# Patient Record
Sex: FEMALE | Race: WHITE | Employment: OTHER | URBAN - METROPOLITAN AREA
[De-identification: names, ages, dates, MRNs, and addresses within clinical notes are randomized per-mention and may not be internally consistent; named-entity substitution may affect disease eponyms.]

---

## 2019-05-08 ENCOUNTER — HOSPITAL ENCOUNTER (OUTPATIENT)
Dept: PHYSICAL THERAPY | Age: 84
Discharge: HOME | End: 2019-05-08
Payer: MEDICARE

## 2019-05-08 PROCEDURE — 97530 THERAPEUTIC ACTIVITIES: CPT

## 2019-05-08 PROCEDURE — 97162 PT EVAL MOD COMPLEX 30 MIN: CPT

## 2019-05-08 NOTE — THERAPY EVALUATION
Zulma Headings  : 1930 [id number] [facility],[address]  [address] [place] [address].  [phone]   [phone]        OUTPATIENT PHYSICAL THERAPY:Initial Assessment 2019         ICD-10: Treatment Diagnosis: Muscle weakness (generalized) (M62.81)Repeated falls (R29.6)  Precautions/Allergies:   Patient has no allergy information on record. Fall Risk Score:     Ambulatory/Rehab Services H2 Model Falls Risk Assessment    Risk Factors:       No Risk Factors Identified Ability to Rise from Chair:       (3)  Multiple attempts, but successful    Falls Prevention Plan:       No modifications necessary   Total: (5 or greater = High Risk): 3     [facility] [employer] All Rights Reserved. [facility] [employer] Patent #[id number]. Federal Law prohibits the replication, distribution or use without written permission from [facility] [employer]     MD Orders: eval and treat MEDICAL/REFERRING DIAGNOSIS:  Other malaise [R53.81]   DATE OF ONSET: 2019  REFERRING PHYSICIAN: Demetrius Carranza NP  RETURN PHYSICIAN APPOINTMENT: 2019     INITIAL ASSESSMENT:  Ms. Ac Robledo presents with marked weakness and balance issues making her a high fall risk. She is adamant she is fine and does not need even a cane for ambulation and is not a fall risk because she does not plan to fall again. She will benefit from skilled PT to address her weakness and improve her balance strategies for safe ADL performance and gait on all surfaces. PROBLEM LIST (Impacting functional limitations):  1. Decreased Strength  2. Decreased ADL/Functional Activities  3. Decreased Transfer Abilities  4. Decreased Ambulation Ability/Technique  5. Decreased Balance  6. Decreased Activity Tolerance  7. Decreased Flexibility/Joint Mobility  8. Decreased Brogue with Home Exercise Program INTERVENTIONS PLANNED:  1. Home Exercise Program (HEP)  2. Therapeutic Activites  3.  Therapeutic Exercise/Strengthening TREATMENT PLAN:  Effective Dates: 5/8/2019 TO 8/6/2019 (90 days). Frequency/Duration: 2 times a week for 90 Days  GOALS: (Goals have been discussed and agreed upon with patient.)  Short-Term Functional Goals: Time Frame: 2 weeks  1. Pt to report compliance with HEP. Discharge Goals: Time Frame: 12 wweks  1. Pt to demonstrate quad strength to allow sit to stand without UE support or bracing legs against a chair. 2. Pt to increase SLS > 5 sec for safe amb level surfaces. 3. Pt to increase hip strength to resume walking the gym track for exercise. Rehabilitation Potential For Stated Goals: Fair  Regarding Sherlyn Mccauley's therapy, I certify that the treatment plan above will be carried out by a therapist or under their direction. Thank you for this referral,  Juliana Gilbert, PT       Referring Physician Signature: Derian Ding., NP              Date                      HISTORY:   History of Present Injury/Illness (Reason for Referral):  Pt has no idea why she is coming to therapy and has no idea what is going on with her. Daughter found her on the floor in Jan.  She had fallen 3 times but not told anyone. She is now living with her daughter and son-in-law. She was hospitalized with pneumonia. She had home health and they said she needed more therapy. Balance is an issue so she uses her cane. She refuses to use a walker stating she doesn't even need a cane. She states she doesn't plan to fall again and wants to resume living in her home independently and resume driving. She states HH told her she passed all their tests and she is better than anyone else her age. Past Medical History/Comorbidities:   Ms. Troy Vogt  has no past medical history on file. Ms. Troy Vogt  has no past surgical history on file.   Social History/Living Environment:     pt lives with family in single story home  Prior Level of Function/Work/Activity:  retired  Dominant Side:         RIGHT  Current Medications:  No current outpatient medications on file. Date Last Reviewed:  5/8/2019   # of Personal Factors/Comorbidities that affect the Plan of Care: 1-2: MODERATE COMPLEXITY   EXAMINATION:   Observation/Orthostatic Postural Assessment:          Pt uses a straight cane for amb. Trunk is very rigid and she has jerky balance reactions. Palpation:          unremarkable  ROM:     0 degrees DF B   Other motions WFL    Strength:     Right  Hip flex 3-/5  Hip ext 3/5  Hip abd 3-/5  Knee ext 4/5  PF 2/5 Left  3-/5  2/5  2/5  4/5  2/5     Neurological Screen:        unremarkable  Functional Mobility:         Gait/Ambulation:  Unsteady, rigid with straight cane        Transfers:  Uses hands to push up        Stairs:  Uses hands to pull on rails, balance checks backwards  Balance:          < 2 sec B   Body Structures Involved:  1. Muscles Body Functions Affected:  1. Movement Related Activities and Participation Affected:  1. General Tasks and Demands  2. Self Care  3. Domestic Life  4. Community, Social and [place] [facility]   # of elements that affect the Plan of Care: 3: MODERATE COMPLEXITY   CLINICAL PRESENTATION:   Presentation: Evolving clinical presentation with changing clinical characteristics: MODERATE COMPLEXITY   CLINICAL DECISION MAKING:   Tool Used: Smith Balance Scale  Score:  Initial: 39/56 Most Recent: X/56 (Date: -- )   Interpretation of Score: Each section is scored on a 0-4 scale, 0 representing the patients inability to perform the task and 4 representing independence. The scores of each section are added together for a total score of 56. The higher the patients score, the more independent the patient is. Any score below 45 indicates increased risk for falls. Medical Necessity:   · Patient demonstrates fair rehab potential due to higher previous functional level. Reason for Services/Other Comments:  · Patient continues to require present interventions due to patient's inability to ambulate safely.    Use of outcome tool(s) and clinical judgement create a POC that gives a: Questionable prediction of patient's progress: MODERATE COMPLEXITY     Total Treatment Duration:  PT Patient Time In/Time Out  Time In: 1145  Time Out: Ariana Fuentes, PT

## 2019-05-08 NOTE — PROGRESS NOTES
Maya Headings  : 1930  [place]  Secondary: [employer] [id number] [address] @ [address]  [address] [place] [address].  [phone]   [phone]      OUTPATIENT PHYSICAL THERAPY: Daily Treatment Note  2019   ICD-10: Treatment Diagnosis: Muscle weakness (generalized) (M62.81)Repeated falls (R29.6)     Effective Dates: 2019 TO 2019 (90 days). Frequency/Duration: 2 times a week for 90 Days  GOALS: (Goals have been discussed and agreed upon with patient.)  Short-Term Functional Goals: Time Frame: 2 weeks  1. Pt to report compliance with HEP. Discharge Goals: Time Frame: 12 wweks  1. Pt to demonstrate quad strength to allow sit to stand without UE support or bracing legs against a chair. 2. Pt to increase SLS > 5 sec for safe amb level surfaces. 3. Pt to increase hip strength to resume walking the gym track for exercise.    _________________________________________________________________________  Pre-treatment Symptoms/Complaints:  Pt wants to resume independent living and walking at the gym for exercise. Pain: Initial: 0/10 Post Session:  0/10   Medications Last Reviewed:  2019    Updated Objective Findings:ROM:      0 degrees DF B   Other motions WFL     Strength:      Right  Hip flex 3-/5  Hip ext 3/5  Hip abd 3-/5  Knee ext 4/5  PF 2/5 Left  3-/5  2/5  2/5  4/5  2/5      Functional Mobility:         Gait/Ambulation:  Unsteady, rigid with straight cane        Transfers:  Uses hands to push up        Stairs:  Uses hands to pull on rails, balance checks backwards  Balance:          < 2 sec B        See evaluation note from today     TREATMENT:   THERAPEUTIC ACTIVITY: ( see below for minutes): Therapeutic activities per grid below to improve mobility. Required moderate verbal and manual cues to improve functional mobility .   THERAPEUTIC EXERCISE: (see below for minutes):  Exercises per grid below to improve strength. Required moderate verbal and manual cues to promote proper body alignment, promote proper body posture, promote proper body mechanics and promote proper body breathing techniques. Progressed resistance, range, repetitions and complexity of movement as indicated. MANUAL THERAPY: (see below for minutes): Joint mobilization and Soft tissue mobilization was utilized and necessary because of the patient's restricted joint motion, painful spasm, loss of articular motion and restricted motion of soft tissue. MODALITIES: (see below for minutes):      for pain modulation       Date: 5/8/19       Modalities:                Therapeutic Exercise:                                                                                Manual Therapy:                Therapeutic Activities: 25 mins       Pt education, HEP, safety education 15 mins       Sit to stand without UE support X 5       SLS B Therapist support x 3         HEP: to continue current  ex program    [employer] Portal  Treatment/Session Summary:    · Response to Treatment:  Pt has significant weaknesses and balance issues that she is in denial about. She is a high fall risk but refuses to use a walker. · Communication/Consultation:  None today  · Equipment provided today:  None today  · Recommendations/Intent for next treatment session: Next visit will focus on strength and balance training with continued pt education.     Total Treatment Billable Duration:  45 mins  PT Patient Time In/Time Out  Time In: 1145  Time Out: 1230    Tina Fuentes, PT

## 2019-05-15 ENCOUNTER — APPOINTMENT (OUTPATIENT)
Dept: PHYSICAL THERAPY | Age: 84
End: 2019-05-15
Payer: MEDICARE

## 2019-05-16 ENCOUNTER — HOSPITAL ENCOUNTER (OUTPATIENT)
Dept: PHYSICAL THERAPY | Age: 84
Discharge: HOME | End: 2019-05-16
Payer: MEDICARE

## 2019-05-16 PROCEDURE — 97530 THERAPEUTIC ACTIVITIES: CPT

## 2019-05-16 PROCEDURE — 97110 THERAPEUTIC EXERCISES: CPT

## 2019-05-16 NOTE — PROGRESS NOTES
Elana Hernández  : 1930  [place]  Secondary: [employer] [address] [facility] @ [address]  [phone]   [phone]      OUTPATIENT PHYSICAL THERAPY: Daily Treatment Note  2019   ICD-10: Treatment Diagnosis: Muscle weakness (generalized) (M62.81)Repeated falls (R29.6)     Effective Dates: 2019 TO 2019 (90 days). Frequency/Duration: 2 times a week for 90 Days  GOALS: (Goals have been discussed and agreed upon with patient.)  Short-Term Functional Goals: Time Frame: 2 weeks  1. Pt to report compliance with HEP. Discharge Goals: Time Frame: 12 wweks  1. Pt to demonstrate quad strength to allow sit to stand without UE support or bracing legs against a chair. 2. Pt to increase SLS > 5 sec for safe amb level surfaces. 3. Pt to increase hip strength to resume walking the gym track for exercise.    _________________________________________________________________________  Pre-treatment Symptoms/Complaints: I still don't think I need this walker because I am carefull and don't plan to fall again. Pain: Initial: 0/10 Post Session:  0/10   Medications Last Reviewed:  2019    Updated Objective Findings:ROM:      0 degrees DF B   Other motions WFL     Strength:      Right  Hip flex 3-/5  Hip ext 3/5  Hip abd 3-/5  Knee ext 4/5  PF 2/5 Left  3-/5  2/5  2/5  4/5  2/5      Functional Mobility:         Gait/Ambulation:  using rolling walker, L LE has rigidity when walking         Transfers:  Uses hands to push up        Stairs:  Uses hands to pull on rails, balance checks backwards  Balance:          < 2 sec B       TREATMENT:   THERAPEUTIC ACTIVITY: ( see below for minutes): Therapeutic activities per grid below to improve mobility. Required moderate verbal and manual cues to improve functional mobility .   THERAPEUTIC EXERCISE: (see below for minutes):  Exercises per grid below to improve strength. Required moderate verbal and manual cues to promote proper body alignment, promote proper body posture, promote proper body mechanics and promote proper body breathing techniques. Progressed resistance, range, repetitions and complexity of movement as indicated. MANUAL THERAPY: (see below for minutes): Joint mobilization and Soft tissue mobilization was utilized and necessary because of the patient's restricted joint motion, painful spasm, loss of articular motion and restricted motion of soft tissue. MODALITIES: (see below for minutes):      for pain modulation       Date: 5/8/19 5/16/19  Visit 2      Modalities:                Therapeutic Exercise:  10 mins      SLR B  X 30      Calf raises  X 30                      Manual Therapy:                Therapeutic Activities: 25 mins 30 mins      Pt education, HEP, safety education 15 mins       Sit to stand without UE support X 5 X 15      SLS B Therapist support x 3 Floor and foam x 2 CGA      Tandem stance   Floor and foam CGA      Step ups ant B  6 in x 30 CGA        HEP: to continue current HH ex program    [employer] Portal  Treatment/Session Summary:    · Response to Treatment:   L LE weakness noted with WB and NWB ex's. Cues needed to lock hip and knee in ext with step ups on L.  · Communication/Consultation:  None today  · Equipment provided today:  None today  · Recommendations/Intent for next treatment session: Next visit will focus on strength and balance training with continued pt education.     Total Treatment Billable Duration:  40 mins  PT Patient Time In/Time Out  Time In: 1100  Time Out: 310 [facility], PT

## 2019-05-20 ENCOUNTER — HOSPITAL ENCOUNTER (OUTPATIENT)
Dept: PHYSICAL THERAPY | Age: 84
Discharge: HOME | End: 2019-05-20
Payer: MEDICARE

## 2019-05-20 PROCEDURE — 97110 THERAPEUTIC EXERCISES: CPT

## 2019-05-20 PROCEDURE — 97530 THERAPEUTIC ACTIVITIES: CPT

## 2019-05-20 NOTE — PROGRESS NOTES
Wendy Crow  : 1930  [place]  Secondary: [employer] Supplement Medicare Ori Le[place] @ [address] [place]ia [place].  [phone]   [phone]      OUTPATIENT PHYSICAL THERAPY: Daily Treatment Note  2019   ICD-10: Treatment Diagnosis: Muscle weakness (generalized) (M62.81)Repeated falls (R29.6)     Effective Dates: 2019 TO 2019 (90 days). Frequency/Duration: 2 times a week for 90 Days  GOALS: (Goals have been discussed and agreed upon with patient.)  Short-Term Functional Goals: Time Frame: 2 weeks  1. Pt to report compliance with HEP. Discharge Goals: Time Frame: 12 wweks  1. Pt to demonstrate quad strength to allow sit to stand without UE support or bracing legs against a chair. 2. Pt to increase SLS > 5 sec for safe amb level surfaces. 3. Pt to increase hip strength to resume walking the gym track for exercise.    _________________________________________________________________________  Pre-treatment Symptoms/Complaints: I am doing good. I got tired after the last time but I didn't think I would. Pain: Initial: 0/10 Post Session:  0/10   Medications Last Reviewed:  2019    Updated Objective Findings:ROM:      0 degrees DF B   Other motions WFL     Strength:      Right  Hip flex 3-/5  Hip ext 3/5  Hip abd 3-/5  Knee ext 4/5  PF 2/5 Left  3-/5  2/5  2/5  4/5  2/5      Functional Mobility:         Gait/Ambulation:  using rolling walker, L LE has rigidity when walking         Transfers:  Uses hands to push up        Stairs:  Uses hands to pull on rails, balance checks backwards  Balance:          < 2 sec B       TREATMENT:   THERAPEUTIC ACTIVITY: ( see below for minutes): Therapeutic activities per grid below to improve mobility. Required moderate verbal and manual cues to improve functional mobility .   THERAPEUTIC EXERCISE: (see below for minutes):  Exercises per grid below to improve strength. Required moderate verbal and manual cues to promote proper body alignment, promote proper body posture, promote proper body mechanics and promote proper body breathing techniques. Progressed resistance, range, repetitions and complexity of movement as indicated. MANUAL THERAPY: (see below for minutes): Joint mobilization and Soft tissue mobilization was utilized and necessary because of the patient's restricted joint motion, painful spasm, loss of articular motion and restricted motion of soft tissue. MODALITIES: (see below for minutes):      for pain modulation       Date: 5/8/19 5/16/19  Visit 2 5/20/19  Visit 3     Modalities:                Therapeutic Exercise:  10 mins 25 mins     SLR B  X 30 X 30     Calf raises  X 30 X 30     S/L hip abd B   X 30     Clams B   X 30             Manual Therapy:                Therapeutic Activities: 25 mins 30 mins 15 mins     Pt education, HEP, safety education 15 mins       Sit to stand without UE support X 5 X 15 X 20     SLS B Therapist support x 3 Floor and foam x 2 CGA Floor x 3 CGA     Tandem stance   Floor and foam CGA Floor x 2 CGA     Step ups ant B  6 in x 30 CGA        HEP: to continue current  ex program    [facility] Portal  Treatment/Session Summary:    · Response to Treatment:  Pt fatigues quickly and needs consistent manual and verbal cues for ex performance. WB movement patterns remain jerky jesus on L LE. Pt still does not feel she has any weakness or balance issues. · Communication/Consultation:  None today  · Equipment provided today:  None today  · Recommendations/Intent for next treatment session: Next visit will focus on strength and balance training with continued pt education.     Total Treatment Billable Duration:  40 mins  PT Patient Time In/Time Out  Time In: 1145  Time Out: 1230    Tina Fuentes, PT

## 2019-05-22 ENCOUNTER — APPOINTMENT (OUTPATIENT)
Dept: PHYSICAL THERAPY | Age: 84
End: 2019-05-22
Payer: MEDICARE

## 2019-05-24 ENCOUNTER — HOSPITAL ENCOUNTER (OUTPATIENT)
Dept: PHYSICAL THERAPY | Age: 84
Discharge: HOME | End: 2019-05-24
Payer: MEDICARE

## 2019-05-24 PROCEDURE — 97530 THERAPEUTIC ACTIVITIES: CPT

## 2019-05-24 NOTE — PROGRESS NOTES
Gilbert Saha  : 1930  [place]  Secondary: [place] @ [address], [place].  [phone]   [phone]      OUTPATIENT PHYSICAL THERAPY: Daily Treatment Note  2019   ICD-10: Treatment Diagnosis: Muscle weakness (generalized) (M62.81)Repeated falls (R29.6)     Effective Dates: 2019 TO 2019 (90 days). Frequency/Duration: 2 times a week for 90 Days  GOALS: (Goals have been discussed and agreed upon with patient.)  Short-Term Functional Goals: Time Frame: 2 weeks  1. Pt to report compliance with HEP. Discharge Goals: Time Frame: 12 wweks  1. Pt to demonstrate quad strength to allow sit to stand without UE support or bracing legs against a chair. 2. Pt to increase SLS > 5 sec for safe amb level surfaces. 3. Pt to increase hip strength to resume walking the gym track for exercise.    _________________________________________________________________________  Pre-treatment Symptoms/Complaints:  I think I am doing good. Pain: Initial: 0/10 Post Session:  0/10   Medications Last Reviewed:  2019    Updated Objective Findings:ROM:      0 degrees DF B   Other motions WFL     Strength:      Right  Hip flex 3-/5  Hip ext 3/5  Hip abd 3-/5  Knee ext 4/5  PF 2/5 Left  3-/5  2/5  2/5  4/5  2/5      Functional Mobility:         Gait/Ambulation:  using rolling walker, L LE has rigidity when walking         Transfers:  Uses hands to push up        Stairs:  Uses hands to pull on rails, balance checks backwards  Balance:          < 2 sec B       TREATMENT:   THERAPEUTIC ACTIVITY: ( see below for minutes): Therapeutic activities per grid below to improve mobility. Required moderate verbal and manual cues to improve functional mobility . THERAPEUTIC EXERCISE: (see below for minutes):  Exercises per grid below to improve strength.   Required moderate verbal and manual cues to promote proper body alignment, promote proper body posture, promote proper body mechanics and promote proper body breathing techniques. Progressed resistance, range, repetitions and complexity of movement as indicated. MANUAL THERAPY: (see below for minutes): Joint mobilization and Soft tissue mobilization was utilized and necessary because of the patient's restricted joint motion, painful spasm, loss of articular motion and restricted motion of soft tissue. MODALITIES: (see below for minutes):      for pain modulation       Date: 5/8/19 5/16/19  Visit 2 5/20/19  Visit 3 5/24/19  Visit 4      Modalities:                    Therapeutic Exercise:  10 mins 25 mins       SLR B  X 30 X 30       Calf raises  X 30 X 30       S/L hip abd B   X 30       Clams B   X 30                 Manual Therapy:                    Therapeutic Activities: 25 mins 30 mins 15 mins 40 mins      Pt education, HEP, safety education 15 mins         Sit to stand without UE support X 5 X 15 X 20 X 20      SLS B Therapist support x 3 Floor and foam x 2 CGA Floor x 3 CGA Floor x 3  CGA      Tandem stance, twists   Floor and foam CGA Floor x 2 CGA Floor x 2  CGA      Step ups ant B  6 in x 30 CGA        Lat stepping in parallel bars    Floor and foam      Foam standing    In parallel bars        HEP: to continue current  ex program    [employer] Portal  Treatment/Session Summary:    · Response to Treatment:   Pt stands with weight shifted on heels and loses balance without UE support. L LE weakness causes pt to lose balance as she decreases stance time on it. Movements remain jerky jesus with sit to stand. Pt locks knees in ext and braces against table to stand and jerks up quickly unless cued not to and then she struggles to get up.   · Communication/Consultation:  None today  · Equipment provided today:  None today  · Recommendations/Intent for next treatment session: Next visit will focus on strength and balance training with continued pt education.     Total Treatment Billable Duration:  40 mins  PT Patient Time In/Time Out  Time In: 0245  Time Out: 0330    Claudette Fuentes, PT

## 2019-05-29 ENCOUNTER — HOSPITAL ENCOUNTER (OUTPATIENT)
Dept: PHYSICAL THERAPY | Age: 84
Discharge: HOME | End: 2019-05-29
Payer: MEDICARE

## 2019-05-29 PROCEDURE — 97530 THERAPEUTIC ACTIVITIES: CPT

## 2019-05-29 NOTE — PROGRESS NOTES
Marty Razo  : 1930  [place]  Secondary: [employer] [id number] @ [address] [address].  [phone]   [phone]      OUTPATIENT PHYSICAL THERAPY: Daily Treatment Note  2019   ICD-10: Treatment Diagnosis: Muscle weakness (generalized) (M62.81)Repeated falls (R29.6)     Effective Dates: 2019 TO 2019 (90 days). Frequency/Duration: 2 times a week for 90 Days  GOALS: (Goals have been discussed and agreed upon with patient.)  Short-Term Functional Goals: Time Frame: 2 weeks  1. Pt to report compliance with HEP. Discharge Goals: Time Frame: 12 wweks  1. Pt to demonstrate quad strength to allow sit to stand without UE support or bracing legs against a chair. 2. Pt to increase SLS > 5 sec for safe amb level surfaces. 3. Pt to increase hip strength to resume walking the gym track for exercise.    _________________________________________________________________________  Pre-treatment Symptoms/Complaints: I have been practicing my ex's. I feel like I am doing good. Pain: Initial: 0/10 Post Session:  0/10   Medications Last Reviewed:  2019    Updated Objective Findings:ROM:      0 degrees DF B   Other motions WFL     Strength:      Right  Hip flex 3-/5  Hip ext 3/5  Hip abd 3-/5  Knee ext 4/5  PF 2/5 Left  3-/5  2/5  2/5  4/5  2/5      Functional Mobility:         Gait/Ambulation:  using rolling walker, L LE has rigidity when walking         Transfers:  Uses hands to push up        Stairs:  Uses hands to pull on rails, balance checks backwards  Balance:          < 2 sec B       TREATMENT:   THERAPEUTIC ACTIVITY: ( see below for minutes): Therapeutic activities per grid below to improve mobility. Required moderate verbal and manual cues to improve functional mobility . THERAPEUTIC EXERCISE: (see below for minutes):  Exercises per grid below to improve strength.   Required moderate verbal and manual cues to promote proper body alignment, promote proper body posture, promote proper body mechanics and promote proper body breathing techniques. Progressed resistance, range, repetitions and complexity of movement as indicated. MANUAL THERAPY: (see below for minutes): Joint mobilization and Soft tissue mobilization was utilized and necessary because of the patient's restricted joint motion, painful spasm, loss of articular motion and restricted motion of soft tissue. MODALITIES: (see below for minutes):      for pain modulation       Date: 5/8/19 5/16/19  Visit 2 5/20/19  Visit 3 5/24/19  Visit 4 5/29/19  Visit 5     Modalities:                    Therapeutic Exercise:  10 mins 25 mins       SLR B  X 30 X 30       Calf raises  X 30 X 30       S/L hip abd B   X 30       Clams B   X 30                 Manual Therapy:                    Therapeutic Activities: 25 mins 30 mins 15 mins 40 mins 40 mins     Pt education, HEP, safety education 15 mins         Sit to stand without UE support X 5 X 15 X 20 X 20 X 20     SLS B Therapist support x 3 Floor and foam x 2 CGA Floor x 3 CGA Floor x 3  CGA X 3 CGA     Tandem stance, twists eyes open and closed  Floor and foam CGA Floor x 2 CGA Floor x 2  CGA 15 mins     Step ups ant B  6 in x 30 CGA        Lat stepping in parallel bars    Floor and foam Floor x 4 laps     Foam standing    In parallel bars      Feet together, eyes open and closed, perturbations     5 mins       HEP: to continue current  ex program    [employer] Portal  Treatment/Session Summary:    · Response to Treatment:  Pt had improved sit to stand after much practice to slow down and use legs as pt pushes through heels and jerks up causing her to fall backwards and brace legs against the table.   · Communication/Consultation:  None today  · Equipment provided today:  None today  · Recommendations/Intent for next treatment session: Next visit will focus on strength and balance training with continued pt education.     Total Treatment Billable Duration:  40 mins  PT Patient Time In/Time Out  Time In: 1145  Time Out: 1230    Tina Fuentes, PT

## 2019-05-31 ENCOUNTER — HOSPITAL ENCOUNTER (OUTPATIENT)
Dept: PHYSICAL THERAPY | Age: 84
Discharge: HOME | End: 2019-05-31
Payer: MEDICARE

## 2019-05-31 PROCEDURE — 97530 THERAPEUTIC ACTIVITIES: CPT

## 2019-05-31 NOTE — PROGRESS NOTES
Ally Nielsen  : 1930  [place]  Secondary: [place] @ [address], [place].  [phone]   [phone]      OUTPATIENT PHYSICAL THERAPY: Daily Treatment Note  2019   ICD-10: Treatment Diagnosis: Muscle weakness (generalized) (M62.81)Repeated falls (R29.6)     Effective Dates: 2019 TO 2019 (90 days). Frequency/Duration: 2 times a week for 90 Days  GOALS: (Goals have been discussed and agreed upon with patient.)  Short-Term Functional Goals: Time Frame: 2 weeks  1. Pt to report compliance with HEP. Discharge Goals: Time Frame: 12 wweks  1. Pt to demonstrate quad strength to allow sit to stand without UE support or bracing legs against a chair. 2. Pt to increase SLS > 5 sec for safe amb level surfaces. 3. Pt to increase hip strength to resume walking the gym track for exercise.    _________________________________________________________________________  Pre-treatment Symptoms/Complaints:  I think I am doing good. Pain: Initial: 0/10 Post Session:  0/10   Medications Last Reviewed:  2019    Updated Objective Findings:ROM:      0 degrees DF B   Other motions WFL     Strength:      Right  Hip flex 3-/5  Hip ext 3/5  Hip abd 3-/5  Knee ext 4/5  PF 2/5 Left  3-/5  2/5  2/5  4/5  2/5      Functional Mobility:         Gait/Ambulation:  using rolling walker, L LE has rigidity when walking         Transfers:  Uses hands to push up        Stairs:  Uses hands to pull on rails, balance checks backwards  Balance:          < 2 sec B       TREATMENT:   THERAPEUTIC ACTIVITY: ( see below for minutes): Therapeutic activities per grid below to improve mobility. Required moderate verbal and manual cues to improve functional mobility . THERAPEUTIC EXERCISE: (see below for minutes):  Exercises per grid below to improve strength.   Required moderate verbal and manual cues to promote proper body alignment, promote proper body posture, promote proper body mechanics and promote proper body breathing techniques. Progressed resistance, range, repetitions and complexity of movement as indicated. MANUAL THERAPY: (see below for minutes): Joint mobilization and Soft tissue mobilization was utilized and necessary because of the patient's restricted joint motion, painful spasm, loss of articular motion and restricted motion of soft tissue. MODALITIES: (see below for minutes):      for pain modulation       Date: 5/8/19 5/16/19  Visit 2 5/20/19  Visit 3 5/24/19  Visit 4 5/29/19  Visit 5 5/31/19  Visit 6    Modalities:                    Therapeutic Exercise:  10 mins 25 mins       SLR B  X 30 X 30       Calf raises  X 30 X 30       S/L hip abd B   X 30       Clams B   X 30                 Manual Therapy:                    Therapeutic Activities: 25 mins 30 mins 15 mins 40 mins 40 mins 40 mins    Pt education, HEP, safety education 15 mins         Sit to stand without UE support X 5 X 15 X 20 X 20 X 20 X 30    SLS B Therapist support x 3 Floor and foam x 2 CGA Floor x 3 CGA Floor x 3  CGA X 3 CGA X 5 CGA    Tandem stance, twists eyes open and closed  Floor and foam CGA Floor x 2 CGA Floor x 2  CGA 15 mins 10 mins    Step ups ant B  6 in x 30 CGA    6 inches  X 30    Lat stepping in parallel bars    Floor and foam Floor x 4 laps     Foam standing    In parallel bars      Feet together, eyes open and closed, perturbations     5 mins 10 mins      HEP: to continue current HH ex program    [employer] Portal  Treatment/Session Summary:    · Response to Treatment: Pt showed improved stability with SLS and performed sit to stand slower and safer. · Communication/Consultation:  None today  · Equipment provided today:  None today  · Recommendations/Intent for next treatment session: Next visit will focus on strength and balance training with continued pt education.     Total Treatment Billable Duration: 40 mins  PT Patient Time In/Time Out  Time In: 1145  Time Out: 1230    Tina Fuentes, PT

## 2019-06-04 ENCOUNTER — HOSPITAL ENCOUNTER (OUTPATIENT)
Dept: PHYSICAL THERAPY | Age: 84
Discharge: HOME | End: 2019-06-04
Payer: MEDICARE

## 2019-06-04 PROCEDURE — 97530 THERAPEUTIC ACTIVITIES: CPT

## 2019-06-04 PROCEDURE — 97110 THERAPEUTIC EXERCISES: CPT

## 2019-06-04 NOTE — PROGRESS NOTES
Rabia Buenrostro  : 1930  [place]  Secondary: [employer] [id number] @ [address], [facility] [place].  [phone]   [phone]      OUTPATIENT PHYSICAL THERAPY: Daily Treatment Note  2019   ICD-10: Treatment Diagnosis: Muscle weakness (generalized) (M62.81)Repeated falls (R29.6)     Effective Dates: 2019 TO 2019 (90 days). Frequency/Duration: 2 times a week for 90 Days  GOALS: (Goals have been discussed and agreed upon with patient.)  Short-Term Functional Goals: Time Frame: 2 weeks  1. Pt to report compliance with HEP. Discharge Goals: Time Frame: 12 wweks  1. Pt to demonstrate quad strength to allow sit to stand without UE support or bracing legs against a chair. 2. Pt to increase SLS > 5 sec for safe amb level surfaces. 3. Pt to increase hip strength to resume walking the gym track for exercise.    _________________________________________________________________________  Pre-treatment Symptoms/Complaints: I almost fell in my room the other day. I didn't have my cane. It was on the other side of the room. Pain: Initial: 0/10 Post Session:  0/10   Medications Last Reviewed:  2019    Updated Objective Findings:ROM:      0 degrees DF B   Other motions WFL     Strength:      Right  Hip flex 3-/5  Hip ext 3/5  Hip abd 3-/5  Knee ext 4/5  PF 2/5 Left  3-/5  2/5  2/5  4/5  2/5      Functional Mobility:         Gait/Ambulation:  using rolling walker, L LE has rigidity when walking         Transfers:  Uses hands to push up        Stairs:  Uses hands to pull on rails, balance checks backwards  Balance:          < 2 sec B       TREATMENT:   THERAPEUTIC ACTIVITY: ( see below for minutes): Therapeutic activities per grid below to improve mobility. Required moderate verbal and manual cues to improve functional mobility .   THERAPEUTIC EXERCISE: (see below for minutes):  Exercises per grid below to improve strength. Required moderate verbal and manual cues to promote proper body alignment, promote proper body posture, promote proper body mechanics and promote proper body breathing techniques. Progressed resistance, range, repetitions and complexity of movement as indicated. MANUAL THERAPY: (see below for minutes): Joint mobilization and Soft tissue mobilization was utilized and necessary because of the patient's restricted joint motion, painful spasm, loss of articular motion and restricted motion of soft tissue. MODALITIES: (see below for minutes):      for pain modulation       Date: 5/8/19 5/16/19  Visit 2 5/20/19  Visit 3 5/24/19  Visit 4 5/29/19  Visit 5 5/31/19  Visit 6 6/4/19  Visit 7    Modalities:                      Therapeutic Exercise:  10 mins 25 mins    20 mins    SLR B  X 30 X 30    X 30    Calf raises  X 30 X 30    X 30    S/L hip abd B   X 30    X 30    Clams B   X 30    X 30               Manual Therapy:                      Therapeutic Activities: 25 mins 30 mins 15 mins 40 mins 40 mins 40 mins 25 mins    Pt education, HEP, safety education 15 mins          Sit to stand without UE support X 5 X 15 X 20 X 20 X 20 X 30 X 30    SLS B Therapist support x 3 Floor and foam x 2 CGA Floor x 3 CGA Floor x 3  CGA X 3 CGA X 5 CGA X 5 CGA    Tandem stance, twists eyes open and closed  Floor and foam CGA Floor x 2 CGA Floor x 2  CGA 15 mins 10 mins 5 mins    Step ups ant B  6 in x 30 CGA    6 inches  X 30 6 in x 30    Lat stepping in parallel bars    Floor and foam Floor x 4 laps      Foam standing    In parallel bars       Feet together, eyes open and closed, perturbations     5 mins 10 mins       HEP: to continue current  ex program    [employer] Portal  Treatment/Session Summary:    · Response to Treatment:  Quality of sit to stand was improved but pt still moves jerky and quickly making her balance unstable.   · Communication/Consultation:  None today  · Equipment provided today: None today  · Recommendations/Intent for next treatment session: Next visit will focus on strength and balance training with continued pt education.     Total Treatment Billable Duration:  40 mins       Clyde Fuentes, PT

## 2019-06-06 ENCOUNTER — HOSPITAL ENCOUNTER (OUTPATIENT)
Dept: PHYSICAL THERAPY | Age: 84
Discharge: HOME | End: 2019-06-06
Payer: MEDICARE

## 2019-06-06 PROCEDURE — 97110 THERAPEUTIC EXERCISES: CPT

## 2019-06-06 PROCEDURE — 97530 THERAPEUTIC ACTIVITIES: CPT

## 2019-06-06 NOTE — PROGRESS NOTES
Kenia Silvestre  : 1930  [place]  Secondary: [employer] [address] @ [address]  [phone]   [phone]      OUTPATIENT PHYSICAL THERAPY: Daily Treatment Note and Progress Note  2019   ICD-10: Treatment Diagnosis: Muscle weakness (generalized) (M62.81)Repeated falls (R29.6)     Effective Dates: 2019 TO 2019 (90 days). Frequency/Duration: 2 times a week for 90 Days  GOALS: (Goals have been discussed and agreed upon with patient.)  Short-Term Functional Goals: Time Frame: 2 weeks  1. Pt to report compliance with HEP. - MET  Discharge Goals: Time Frame: 12 wweks  1. Pt to demonstrate quad strength to allow sit to stand without UE support or bracing legs against a chair. - ongoing  2. Pt to increase SLS > 5 sec for safe amb level surfaces. - ongoing  3. Pt to increase hip strength to resume walking the gym track for exercise. - ongoing    _________________________________________________________________________  Pre-treatment Symptoms/Complaints:  I feel good and I think I am doing good. I don't think I will fall again.   Pain: Initial: 0/10 Post Session:  0/10   Medications Last Reviewed:  2019    Updated Objective Findings:  19  ROM:      0 degrees DF B   Other motions WFL       Strength:      Right  Hip flex 4-/5  Hip ext 4/5  Hip abd 4/5  Knee ext 5/5  PF 4/5 Left  4-/5  4-/5  4-/5  5/5  4/5      Functional Mobility:         Gait/Ambulation:  using rolling walker, L LE has rigidity when walking         Transfers:  Uses hands to push up        Stairs:  Uses hands to pull on rails, balance checks backwards  Balance:          < 2 sec B      Tool Used: Smith Balance Scale  Score:  Initial: 39/56 Most Recent: 4556 (Date: 19 )   Interpretation of Score: Each section is scored on a 0-4 scale, 0 representing the patients inability to perform the task and 4 representing independence.  The scores of each section are added together for a total score of 56.  The higher the patients score, the more independent the patient is. Any score below 45 indicates increased risk for falls. TREATMENT:   THERAPEUTIC ACTIVITY: ( see below for minutes): Therapeutic activities per grid below to improve mobility. Required moderate verbal and manual cues to improve functional mobility . THERAPEUTIC EXERCISE: (see below for minutes):  Exercises per grid below to improve strength. Required moderate verbal and manual cues to promote proper body alignment, promote proper body posture, promote proper body mechanics and promote proper body breathing techniques. Progressed resistance, range, repetitions and complexity of movement as indicated. MANUAL THERAPY: (see below for minutes): Joint mobilization and Soft tissue mobilization was utilized and necessary because of the patient's restricted joint motion, painful spasm, loss of articular motion and restricted motion of soft tissue.    MODALITIES: (see below for minutes):      for pain modulation       Date: 5/8/19 5/16/19  Visit 2 5/20/19  Visit 3 5/24/19  Visit 4 5/29/19  Visit 5 5/31/19  Visit 6 6/4/19  Visit 7 6/6/19  Visit 8  PN    Modalities:                        Therapeutic Exercise:  10 mins 25 mins    20 mins 10 mins    SLR B  X 30 X 30    X 30     Calf raises  X 30 X 30    X 30 X 30    S/L hip abd B   X 30    X 30     Clams B   X 30    X 30     Marching in place        Fast pace  CGA                Manual Therapy:                        Therapeutic Activities: 25 mins 30 mins 15 mins 40 mins 40 mins 40 mins 25 mins 30 mins    Pt education, HEP, safety education 15 mins           Sit to stand without UE support X 5 X 15 X 20 X 20 X 20 X 30 X 30 4 x 10     SLS B Therapist support x 3 Floor and foam x 2 CGA Floor x 3 CGA Floor x 3  CGA X 3 CGA X 5 CGA X 5 CGA X 3 min assist to hold 1 min    bridging        X 30  Single x 30 Tandem stance, twists eyes open and closed  Floor and foam CGA Floor x 2 CGA Floor x 2  CGA 15 mins 10 mins 5 mins 5 mins    Step ups ant B  6 in x 30 CGA    6 inches  X 30 6 in x 30 6 in x 30    Lat stepping in parallel bars    Floor and foam Floor x 4 laps       Foam standing    In parallel bars        Feet together, eyes open and closed, perturbations     5 mins 10 mins  5 mins    Foot taps on step        3 mins  CGA                  HEP: to continue current  ex program    [employer] Portal  Treatment/Session Summary:    · Response to Treatment:   Pt has made some improvement as evidenced by Smith score. She still needs cues to lean forward to engage quads and gluts for sit to stand as she uses momentum and throws herself backwards on heels forcing her to brace her legs against the chair to stand. She can do it well with verbal and manual cues. She continues to believe she is safe with all ambulation and doesn't need the walker even though she sees the balance checks she makes during ex's. Pt gives a good effort with rehab. Will continue POC as indicated. · Communication/Consultation:  None today  · Equipment provided today:  None today  · Recommendations/Intent for next treatment session: Next visit will focus on strength and balance training with continued pt education.     Total Treatment Billable Duration:  40 mins  PT Patient Time In/Time Out  Time In: 1100  Time Out: 310 [facility] DON Fuentes

## 2019-06-11 ENCOUNTER — HOSPITAL ENCOUNTER (OUTPATIENT)
Dept: PHYSICAL THERAPY | Age: 84
Discharge: HOME | End: 2019-06-11
Payer: MEDICARE

## 2019-06-13 ENCOUNTER — HOSPITAL ENCOUNTER (OUTPATIENT)
Dept: PHYSICAL THERAPY | Age: 84
Discharge: HOME | End: 2019-06-13
Payer: MEDICARE

## 2019-06-13 PROCEDURE — 97110 THERAPEUTIC EXERCISES: CPT

## 2019-06-13 PROCEDURE — 97530 THERAPEUTIC ACTIVITIES: CPT

## 2019-06-13 NOTE — PROGRESS NOTES
Marty Razo  : 1930  [place]  Secondary: [place] [address]  [phone]   [phone]      OUTPATIENT PHYSICAL THERAPY: Daily Treatment Note  2019   ICD-10: Treatment Diagnosis: Muscle weakness (generalized) (M62.81)Repeated falls (R29.6)     Effective Dates: 2019 TO 2019 (90 days). Frequency/Duration: 2 times a week for 90 Days  GOALS: (Goals have been discussed and agreed upon with patient.)  Short-Term Functional Goals: Time Frame: 2 weeks  1. Pt to report compliance with HEP. - MET  Discharge Goals: Time Frame: 12 wweks  1. Pt to demonstrate quad strength to allow sit to stand without UE support or bracing legs against a chair. - ongoing  2. Pt to increase SLS > 5 sec for safe amb level surfaces. - ongoing  3. Pt to increase hip strength to resume walking the gym track for exercise. - ongoing    _________________________________________________________________________  Pre-treatment Symptoms/Complaints: I feel like I am doing fine. I don't think I will fall again.   Pain: Initial: 0/10 Post Session:  0/10   Medications Last Reviewed:  2019    Updated Objective Findings:  19  ROM:      0 degrees DF B   Other motions WFL       Strength:      Right  Hip flex 4-/5  Hip ext 4/5  Hip abd 4/5  Knee ext 5/5  PF 4/5 Left  4-/5  4-/5  4-/5  5/5  4/5      Functional Mobility:         Gait/Ambulation:  using rolling walker, L LE has rigidity when walking         Transfers:  Uses hands to push up        Stairs:  Uses hands to pull on rails, balance checks backwards  Balance:          < 2 sec B      Tool Used: Smith Balance Scale  Score:  Initial: 39/56 Most Recent: 4556 (Date: 19 )   Interpretation of Score: Each section is scored on a 0-4 scale, 0 representing the patients inability to perform the task and 4 representing independence.  The scores of each section are added together for a total score of 56.  The higher the patients score, the more independent the patient is. Any score below 45 indicates increased risk for falls. TREATMENT:   THERAPEUTIC ACTIVITY: ( see below for minutes): Therapeutic activities per grid below to improve mobility. Required moderate verbal and manual cues to improve functional mobility . THERAPEUTIC EXERCISE: (see below for minutes):  Exercises per grid below to improve strength. Required moderate verbal and manual cues to promote proper body alignment, promote proper body posture, promote proper body mechanics and promote proper body breathing techniques. Progressed resistance, range, repetitions and complexity of movement as indicated. MANUAL THERAPY: (see below for minutes): Joint mobilization and Soft tissue mobilization was utilized and necessary because of the patient's restricted joint motion, painful spasm, loss of articular motion and restricted motion of soft tissue.    MODALITIES: (see below for minutes):      for pain modulation       Date: 5/24/19  Visit 4 5/29/19  Visit 5 5/31/19  Visit 6 6/4/19  Visit 7 6/6/19  Visit 8  PN 6/13/19  Visit 9      Modalities:                        Therapeutic Exercise:    20 mins 10 mins 15 mins      SLR B    X 30        Calf raises    X 30 X 30 X 30      S/L hip abd B    X 30  X 30      Clams B    X 30  X 30      Marching in place     Fast pace  CGA On foam   CGA                  Manual Therapy:                        Therapeutic Activities: 40 mins 40 mins 40 mins 25 mins 30 mins 30 mins      Pt education, HEP, safety education            Sit to stand without UE support X 20 X 20 X 30 X 30 4 x 10  4 x 10 loading L       SLS B Floor x 3  CGA X 3 CGA X 5 CGA X 5 CGA X 3 min assist to hold 1 min 1 min assisted holds x 4      bridging     X 30  Single x 30 X 30  Single x 30      Tandem stance, twists eyes open and closed Floor x 2  CGA 15 mins 10 mins 5 mins 5 mins 5 mins      Step ups ant B   6 inches  X 30 6 in x 30 6 in x 30 6 in x 30      Lat stepping in parallel bars Floor and foam Floor x 4 laps          Foam standing, twists In parallel bars     Eyes open and closed      Feet together, eyes open and closed, perturbations  5 mins 10 mins  5 mins       Foot taps on step     3 mins  CGA       stairs      CGA        HEP: to continue current HH ex program    [employer] Portal  Treatment/Session Summary:    · Response to Treatment:  Pt still loses balance backwards. She does not recognize her fall risk. Movements remain quick and jerky. · Communication/Consultation:  None today  · Equipment provided today:  None today  · Recommendations/Intent for next treatment session: Next visit will focus on strength and balance training with continued pt education.     Total Treatment Billable Duration:  45 mins  PT Patient Time In/Time Out  Time In: 1100  Time Out: 310 [facility], PT

## 2019-06-18 ENCOUNTER — HOSPITAL ENCOUNTER (OUTPATIENT)
Dept: PHYSICAL THERAPY | Age: 84
Discharge: HOME | End: 2019-06-18
Payer: MEDICARE

## 2019-06-18 PROCEDURE — 97530 THERAPEUTIC ACTIVITIES: CPT

## 2019-06-18 PROCEDURE — 97110 THERAPEUTIC EXERCISES: CPT

## 2019-06-20 ENCOUNTER — HOSPITAL ENCOUNTER (OUTPATIENT)
Dept: PHYSICAL THERAPY | Age: 84
Discharge: HOME | End: 2019-06-20
Payer: MEDICARE

## 2019-06-20 PROCEDURE — 97530 THERAPEUTIC ACTIVITIES: CPT

## 2019-06-20 PROCEDURE — 97110 THERAPEUTIC EXERCISES: CPT

## 2019-06-20 NOTE — PROGRESS NOTES
Tejinder Arciniega  : 1930  [place]  Secondary: [place] @ [address]  [address] [facility]  [phone]   [phone]      OUTPATIENT PHYSICAL THERAPY: Daily Treatment Note  2019   ICD-10: Treatment Diagnosis: Muscle weakness (generalized) (M62.81)Repeated falls (R29.6)     Effective Dates: 2019 TO 2019 (90 days). Frequency/Duration: 2 times a week for 90 Days  GOALS: (Goals have been discussed and agreed upon with patient.)  Short-Term Functional Goals: Time Frame: 2 weeks  1. Pt to report compliance with HEP. - MET  Discharge Goals: Time Frame: 12 wweks  1. Pt to demonstrate quad strength to allow sit to stand without UE support or bracing legs against a chair. - ongoing  2. Pt to increase SLS > 5 sec for safe amb level surfaces. - ongoing  3. Pt to increase hip strength to resume walking the gym track for exercise. - ongoing    _________________________________________________________________________  Pre-treatment Symptoms/Complaints: I still feel like I am doing okay. I try to remember to use my walker but not in the house. I use my cane there when I think about  It.   Pain: Initial: 0/10 Post Session:  0/10   Medications Last Reviewed:  2019    Updated Objective Findings:  19  ROM:      0 degrees DF B   Other motions WFL       Strength:      Right  Hip flex 4-/5  Hip ext 4/5  Hip abd 4/5  Knee ext 5/5  PF 4/5 Left  4-/5  4-/5  4-/5  5/5  4/5      Functional Mobility:         Gait/Ambulation:  using rolling walker, L LE has rigidity when walking         Transfers:  Uses hands to push up        Stairs:  Uses hands to pull on rails, balance checks backwards  Balance:          < 2 sec B      Tool Used: Smith Balance Scale  Score:  Initial: 39/56 Most Recent: 45/56 (Date: 19 )   Interpretation of Score: Each section is scored on a 0-4 scale, 0 representing the patients inability to perform the task and 4 representing independence.  The scores of each section are added together for a total score of 56.  The higher the patients score, the more independent the patient is. Any score below 45 indicates increased risk for falls. TREATMENT:   THERAPEUTIC ACTIVITY: ( see below for minutes): Therapeutic activities per grid below to improve mobility. Required moderate verbal and manual cues to improve functional mobility . THERAPEUTIC EXERCISE: (see below for minutes):  Exercises per grid below to improve strength. Required moderate verbal and manual cues to promote proper body alignment, promote proper body posture, promote proper body mechanics and promote proper body breathing techniques. Progressed resistance, range, repetitions and complexity of movement as indicated. MANUAL THERAPY: (see below for minutes): Joint mobilization and Soft tissue mobilization was utilized and necessary because of the patient's restricted joint motion, painful spasm, loss of articular motion and restricted motion of soft tissue.    MODALITIES: (see below for minutes):      for pain modulation       Date: 5/24/19  Visit 4 5/29/19  Visit 5 5/31/19  Visit 6 6/4/19  Visit 7 6/6/19  Visit 8  PN 6/13/19  Visit 9 6/18/19  Visit 10 6/20/19  Visit 11    Modalities:                        Therapeutic Exercise:    20 mins 10 mins 15 mins 15 mins 15 mins    SLR B    X 30        Calf raises    X 30 X 30 X 30 X 30 X 30    S/L hip abd B    X 30  X 30 X 30 X 30    Clams B    X 30  X 30 X 30 X 30    Marching in place     Fast pace  CGA On foam   CGA On foam  CGA On foam  CGA                Manual Therapy:                        Therapeutic Activities: 40 mins 40 mins 40 mins 25 mins 30 mins 30 mins 25 mins 25 mins    Pt education, HEP, safety education            Sit to stand without UE support X 20 X 20 X 30 X 30 4 x 10  4 x 10 loading L  X 30 X 30    SLS B Floor x 3  CGA X 3 CGA X 5 CGA X 5 CGA X 3 min assist to hold 1 min 1 min assisted holds x 4 X 3 ea CGA X 3 ea CGA    bridging     X 30  Single x 30 X 30  Single x 30 X 30  Single x 30 Single x 30    Tandem stance, twists eyes open and closed Floor x 2  CGA 15 mins 10 mins 5 mins 5 mins 5 mins  On floor     Step ups ant B   6 inches  X 30 6 in x 30 6 in x 30 6 in x 30  6 in x 30    Lat stepping in parallel bars Floor and foam Floor x 4 laps          Foam standing, twists In parallel bars     Eyes open and closed Eyes open, closed     Feet together, eyes open and closed, perturbations  5 mins 10 mins  5 mins  5 mins     Foot taps on step     3 mins  CGA       stairs      CGA CGA CGA      HEP: to continue current HH ex program    [employer] Portal  Treatment/Session Summary:    · Response to Treatment:   Pt has good ex kenyatta. She remains unstable with balance jesus backwards. · Communication/Consultation:  None today  · Equipment provided today:  None today  · Recommendations/Intent for next treatment session: Next visit will focus on strength and balance training with continued pt education.     Total Treatment Billable Duration:  40 mins  PT Patient Time In/Time Out  Time In: 1100  Time Out: 310 [facility] Alfredo, PT

## 2019-06-25 ENCOUNTER — APPOINTMENT (OUTPATIENT)
Dept: PHYSICAL THERAPY | Age: 84
End: 2019-06-25
Payer: MEDICARE

## 2019-06-27 ENCOUNTER — APPOINTMENT (OUTPATIENT)
Dept: PHYSICAL THERAPY | Age: 84
End: 2019-06-27
Payer: MEDICARE

## 2019-07-02 ENCOUNTER — HOSPITAL ENCOUNTER (OUTPATIENT)
Dept: PHYSICAL THERAPY | Age: 84
Discharge: HOME | End: 2019-07-02
Payer: MEDICARE

## 2019-07-02 PROCEDURE — 97110 THERAPEUTIC EXERCISES: CPT

## 2019-07-02 PROCEDURE — 97530 THERAPEUTIC ACTIVITIES: CPT

## 2019-07-02 NOTE — PROGRESS NOTES
Vinod Licea  : 1930  [place]  Secondary: [place] [address]  [phone]   [phone]      OUTPATIENT PHYSICAL THERAPY: Daily Treatment Note  2019   ICD-10: Treatment Diagnosis: Muscle weakness (generalized) (M62.81)Repeated falls (R29.6)     Effective Dates: 2019 TO 2019 (90 days). Frequency/Duration: 2 times a week for 90 Days  GOALS: (Goals have been discussed and agreed upon with patient.)  Short-Term Functional Goals: Time Frame: 2 weeks  1. Pt to report compliance with HEP. - MET  Discharge Goals: Time Frame: 12 wweks  1. Pt to demonstrate quad strength to allow sit to stand without UE support or bracing legs against a chair. - ongoing  2. Pt to increase SLS > 5 sec for safe amb level surfaces. - ongoing  3. Pt to increase hip strength to resume walking the gym track for exercise. - ongoing    _________________________________________________________________________  Pre-treatment Symptoms/Complaints: [place] the [place]. I was fine with my cane.   Pain: Initial: 0/10 Post Session:  0/10   Medications Last Reviewed:  2019    Updated Objective Findings:  19  ROM:      0 degrees DF B   Other motions WFL       Strength:      Right  Hip flex 4-/5  Hip ext 4/5  Hip abd 4/5  Knee ext 5/5  PF 4/5 Left  4-/5  4-/5  4-/5  5/5  4/5      Functional Mobility:         Gait/Ambulation:  using rolling walker, L LE has rigidity when walking         Transfers:  Uses hands to push up        Stairs:  Uses hands to pull on rails, balance checks backwards  Balance:          < 2 sec B      Tool Used: Smith Balance Scale  Score:  Initial: 39/56 Most Recent: 4556 (Date: 6/6/19 )   Interpretation of Score: Each section is scored on a 0-4 scale, 0 representing the patients inability to perform the task and 4 representing independence.  The scores of each section are added together for a total score of 56.  The higher the patients score, the more independent the patient is. Any score below 45 indicates increased risk for falls. TREATMENT:   THERAPEUTIC ACTIVITY: ( see below for minutes): Therapeutic activities per grid below to improve mobility. Required moderate verbal and manual cues to improve functional mobility . THERAPEUTIC EXERCISE: (see below for minutes):  Exercises per grid below to improve strength. Required moderate verbal and manual cues to promote proper body alignment, promote proper body posture, promote proper body mechanics and promote proper body breathing techniques. Progressed resistance, range, repetitions and complexity of movement as indicated. MANUAL THERAPY: (see below for minutes): Joint mobilization and Soft tissue mobilization was utilized and necessary because of the patient's restricted joint motion, painful spasm, loss of articular motion and restricted motion of soft tissue.    MODALITIES: (see below for minutes):      for pain modulation       Date: 5/24/19  Visit 4 5/29/19  Visit 5 5/31/19  Visit 6 6/4/19  Visit 7 6/6/19  Visit 8  PN 6/13/19  Visit 9 6/18/19  Visit 10 6/20/19  Visit 11 7/2/19  Visit 12   Modalities:                        Therapeutic Exercise:    20 mins 10 mins 15 mins 15 mins 15 mins 15 mins   SLR B    X 30        Calf raises    X 30 X 30 X 30 X 30 X 30 X 30   S/L hip abd B    X 30  X 30 X 30 X 30 X 30   Clams B    X 30  X 30 X 30 X 30 X 30   Marching in place     Fast pace  CGA On foam   CGA On foam  CGA On foam  CGA CGA x 30 each leg               Manual Therapy:                        Therapeutic Activities: 40 mins 40 mins 40 mins 25 mins 30 mins 30 mins 25 mins 25 mins 30 mins   Pt education, HEP, safety education            Sit to stand without UE support X 20 X 20 X 30 X 30 4 x 10  4 x 10 loading L  X 30 X 30 X 30   SLS B Floor x 3  CGA X 3 CGA X 5 CGA X 5 CGA X 3 min assist to hold 1 min 1 min assisted holds x 4 X 3 ea CGA X 3 ea CGA X 10 ea CGA   bridging     X 30  Single x 30 X 30   Single x 30 X 30   Single x 30 Single x 30 Marching   X 30   Tandem stance, twists eyes open and closed Floor x 2  CGA 15 mins 10 mins 5 mins 5 mins 5 mins  On floor  On floor   Step ups ant B   6 inches  X 30 6 in x 30 6 in x 30 6 in x 30  6 in x 30    Lat stepping in parallel bars Floor and foam Floor x 4 laps          Foam standing, twists In parallel bars     Eyes open and closed Eyes open, closed     Feet together, eyes open and closed, perturbations  5 mins 10 mins  5 mins  5 mins  5 mins   Foot taps on step     3 mins  CGA       stairs      CGA CGA CGA CGA   Tall kneeling on table         perturbations     HEP: weight shifted to balls of feet, stagger stance for stability    [facility] Portal  Treatment/Session Summary:    · Response to Treatment:   Focused on keeping weight forward as pt loses balance backwards consistently. Also practiced stagger stance for more stability in standing balance. · Communication/Consultation:  None today  · Equipment provided today:  None today  · Recommendations/Intent for next treatment session: Next visit will focus on strength and balance training with continued pt education.     Total Treatment Billable Duration:  40 mins  PT Patient Time In/Time Out  Time In: 1100  Time Out: 310 [facility] Alfredo, PT

## 2019-07-09 ENCOUNTER — HOSPITAL ENCOUNTER (OUTPATIENT)
Dept: PHYSICAL THERAPY | Age: 84
Discharge: HOME | End: 2019-07-09
Payer: MEDICARE

## 2019-07-09 PROCEDURE — 97530 THERAPEUTIC ACTIVITIES: CPT

## 2019-07-09 PROCEDURE — 97110 THERAPEUTIC EXERCISES: CPT

## 2019-07-09 NOTE — PROGRESS NOTES
Kelly Cazares  : 1930  [place]  Secondary: [place] @ [address], [address] [place] [address]  [phone]   [phone]      OUTPATIENT PHYSICAL THERAPY: Daily Treatment Note  2019   ICD-10: Treatment Diagnosis: Muscle weakness (generalized) (M62.81)Repeated falls (R29.6)     Effective Dates: 2019 TO 2019 (90 days). Frequency/Duration: 2 times a week for 90 Days  GOALS: (Goals have been discussed and agreed upon with patient.)  Short-Term Functional Goals: Time Frame: 2 weeks  1. Pt to report compliance with HEP. - MET  Discharge Goals: Time Frame: 12 wweks  1. Pt to demonstrate quad strength to allow sit to stand without UE support or bracing legs against a chair. - ongoing  2. Pt to increase SLS > 5 sec for safe amb level surfaces. - ongoing  3. Pt to increase hip strength to resume walking the gym track for exercise. - ongoing    _________________________________________________________________________  Pre-treatment Symptoms/Complaints:  I have been doing good. Daughter reports pt almost fell walking on the sidewalk several days ago.    Pain: Initial: 0/10 Post Session:  0/10   Medications Last Reviewed:  2019    Updated Objective Findings:  19  ROM:      0 degrees DF B   Other motions WFL       Strength:      Right  Hip flex 4-/5  Hip ext 4/5  Hip abd 4/5  Knee ext 5/5  PF 4/5 Left  4-/5  4-/5  4-/5  5/5  4/5      Functional Mobility:         Gait/Ambulation:  using rolling walker, L LE has rigidity when walking         Transfers:  Uses hands to push up        Stairs:  Uses hands to pull on rails, balance checks backwards  Balance:          < 2 sec B      Tool Used: Smith Balance Scale  Score:  Initial: 39/56 Most Recent: 4556 (Date: 6/6/19 )   Interpretation of Score: Each section is scored on a 0-4 scale, 0 representing the patients inability to perform the task and 4 representing independence.  The scores of each section are added together for a total score of 56.  The higher the patients score, the more independent the patient is. Any score below 45 indicates increased risk for falls. TREATMENT:   THERAPEUTIC ACTIVITY: ( see below for minutes): Therapeutic activities per grid below to improve mobility. Required moderate verbal and manual cues to improve functional mobility . THERAPEUTIC EXERCISE: (see below for minutes):  Exercises per grid below to improve strength. Required moderate verbal and manual cues to promote proper body alignment, promote proper body posture, promote proper body mechanics and promote proper body breathing techniques. Progressed resistance, range, repetitions and complexity of movement as indicated. MANUAL THERAPY: (see below for minutes): Joint mobilization and Soft tissue mobilization was utilized and necessary because of the patient's restricted joint motion, painful spasm, loss of articular motion and restricted motion of soft tissue.    MODALITIES: (see below for minutes):      for pain modulation       Date: 6/4/19  Visit 7 6/6/19  Visit 8  PN 6/13/19  Visit 9 6/18/19  Visit 10 6/20/19  Visit 11 7/2/19  Visit 12 7/9/19  Visit 13     Modalities:                        Therapeutic Exercise: 20 mins 10 mins 15 mins 15 mins 15 mins 15 mins 15 mins     SLR B X 30           Calf raises X 30 X 30 X 30 X 30 X 30 X 30 X 30     S/L hip abd B X 30  X 30 X 30 X 30 X 30 X 30     Clams B X 30  X 30 X 30 X 30 X 30 X 30     Marching in place  Fast pace  CGA On foam   CGA On foam  CGA On foam  CGA CGA x 30 each leg CGA x 30 each leg                  Manual Therapy:                        Therapeutic Activities: 25 mins 30 mins 30 mins 25 mins 25 mins 30 mins 25 mins     Pt education, HEP, safety education            Sit to stand without UE support X 30 4 x 10  4 x 10 loading L  X 30 X 30 X 30 X 30     SLS B X 5 CGA X 3 min assist to hold 1 min 1 min assisted holds x 4 X 3 ea CGA X 3 ea CGA X 10 ea CGA X 10 ea CGA     bridging  X 30  Single x 30 X 30   Single x 30 X 30  Single x 30 Single x 30 Marching   X 30      Tandem stance, twists eyes open and closed 5 mins 5 mins 5 mins  On floor  On floor On floor     Step ups ant B 6 in x 30 6 in x 30 6 in x 30  6 in x 30  8 in x 20     Lat stepping in parallel bars            Foam standing, twists   Eyes open and closed Eyes open, closed        Feet together, eyes open and closed, perturbations  5 mins  5 mins  5 mins      Foot taps on step  3 mins  CGA          stairs   CGA CGA CGA CGA CGA     Tall kneeling on table      perturbations        HEP: weight shifted to balls of feet, stagger stance for stability    [employer] Portal  Treatment/Session Summary:    · Response to Treatment:   Pt showing improved strength in B LE's. Balance checks continue. Pt continues to not recognize her safety issues. · Communication/Consultation:  None today  · Equipment provided today:  None today  · Recommendations/Intent for next treatment session: Next visit will focus on strength and balance training with continued pt education.     Total Treatment Billable Duration:  40 mins  PT Patient Time In/Time Out  Time In: 1100  Time Out: 310 [facility] Alfredo, PT

## 2019-07-11 ENCOUNTER — HOSPITAL ENCOUNTER (OUTPATIENT)
Dept: PHYSICAL THERAPY | Age: 84
Discharge: HOME | End: 2019-07-11
Payer: MEDICARE

## 2019-07-11 PROCEDURE — 97530 THERAPEUTIC ACTIVITIES: CPT

## 2019-07-11 PROCEDURE — 97110 THERAPEUTIC EXERCISES: CPT

## 2019-07-11 NOTE — PROGRESS NOTES
Beatrice Guillory  : 1930  [place]  Secondary: [place] [address]  [phone]   [phone]      OUTPATIENT PHYSICAL THERAPY: Daily Treatment Note  2019   ICD-10: Treatment Diagnosis: Muscle weakness (generalized) (M62.81)Repeated falls (R29.6)     Effective Dates: 2019 TO 2019 (90 days). Frequency/Duration: 2 times a week for 90 Days  GOALS: (Goals have been discussed and agreed upon with patient.)  Short-Term Functional Goals: Time Frame: 2 weeks  1. Pt to report compliance with HEP. - MET  Discharge Goals: Time Frame: 12 wweks  1. Pt to demonstrate quad strength to allow sit to stand without UE support or bracing legs against a chair. - ongoing  2. Pt to increase SLS > 5 sec for safe amb level surfaces. - ongoing  3. Pt to increase hip strength to resume walking the gym track for exercise. - ongoing    _________________________________________________________________________  Pre-treatment Symptoms/Complaints:   I am getting stronger. I think I am doing good.   Pain: Initial: 0/10 Post Session:  0/10   Medications Last Reviewed:  2019    Updated Objective Findings:  19  ROM:      0 degrees DF B   Other motions WFL       Strength:      Right  Hip flex 4-/5  Hip ext 4/5  Hip abd 4/5  Knee ext 5/5  PF 4/5 Left  4-/5  4-/5  4-/5  5/5  4/5      Functional Mobility:         Gait/Ambulation:  using rolling walker, L LE has rigidity when walking         Transfers:  Uses hands to push up        Stairs:  Uses hands to pull on rails, balance checks backwards  Balance:          < 2 sec B      Tool Used: Smith Balance Scale  Score:  Initial: 39/56 Most Recent: 4556 (Date: 19 )   Interpretation of Score: Each section is scored on a 0-4 scale, 0 representing the patients inability to perform the task and 4 representing independence.  The scores of each section are added together for a total score of 56.  The higher the patients score, the more independent the patient is. Any score below 45 indicates increased risk for falls. TREATMENT:   THERAPEUTIC ACTIVITY: ( see below for minutes): Therapeutic activities per grid below to improve mobility. Required moderate verbal and manual cues to improve functional mobility . THERAPEUTIC EXERCISE: (see below for minutes):  Exercises per grid below to improve strength. Required moderate verbal and manual cues to promote proper body alignment, promote proper body posture, promote proper body mechanics and promote proper body breathing techniques. Progressed resistance, range, repetitions and complexity of movement as indicated. MANUAL THERAPY: (see below for minutes): Joint mobilization and Soft tissue mobilization was utilized and necessary because of the patient's restricted joint motion, painful spasm, loss of articular motion and restricted motion of soft tissue.    MODALITIES: (see below for minutes):      for pain modulation       Date: 6/4/19  Visit 7 6/6/19  Visit 8  PN 6/13/19  Visit 9 6/18/19  Visit 10 6/20/19  Visit 11 7/2/19  Visit 12 7/9/19  Visit 13 7/11/19  Visit 14     Modalities:                          Therapeutic Exercise: 20 mins 10 mins 15 mins 15 mins 15 mins 15 mins 15 mins 15 mins     SLR B X 30            Calf raises X 30 X 30 X 30 X 30 X 30 X 30 X 30 X 30     S/L hip abd B X 30  X 30 X 30 X 30 X 30 X 30 X 30     Clams B X 30  X 30 X 30 X 30 X 30 X 30 X 30     Marching in place  Fast pace  CGA On foam   CGA On foam  CGA On foam  CGA CGA x 30 each leg CGA x 30 each leg  CGA x 30 each leg                  Manual Therapy:                          Therapeutic Activities: 25 mins 30 mins 30 mins 25 mins 25 mins 30 mins 25 mins 30 mins     Pt education, HEP, safety education             Sit to stand without UE support X 30 4 x 10  4 x 10 loading L  X 30 X 30 X 30 X 30 X 30     SLS B X 5 CGA X 3 min assist to hold 1 min 1 min assisted holds x 4 X 3 ea CGA X 3 ea CGA X 10 ea CGA X 10 ea CGA X 5 each CGA     bridging  X 30  Single x 30 X 30  Single x 30 X 30   Single x 30 Single x 30 Marching x 30  Single x 30     Tandem stance, twists eyes open and closed 5 mins 5 mins 5 mins  On floor  On floor On floor On floor     Step ups ant B 6 in x 30 6 in x 30 6 in x 30  6 in x 30  8 in x 20 8 in x 20     Lat stepping in parallel bars             Foam standing, twists   Eyes open and closed Eyes open, closed         Feet together, eyes open and closed, perturbations  5 mins  5 mins  5 mins  3 mins     Foot taps on step  3 mins  CGA      X 30     stairs   CGA CGA CGA CGA CGA      Tall kneeling on table      perturbations  perturbations       HEP: weight shifted to balls of feet, stagger stance for stability    [facility] Portal  Treatment/Session Summary:    · Response to Treatment:  Pt is showing improved strength and had fairly good static balance but dynamic balance remains very challenged. · Communication/Consultation:  None today  · Equipment provided today:  None today  · Recommendations/Intent for next treatment session: Next visit will focus on strength and balance training with continued pt education.     Total Treatment Billable Duration:  45 mins  PT Patient Time In/Time Out  Time In: 4268  Time Out: 528[address] Braulio Fuentes, PT

## 2019-07-16 ENCOUNTER — HOSPITAL ENCOUNTER (OUTPATIENT)
Dept: PHYSICAL THERAPY | Age: 84
Discharge: HOME | End: 2019-07-16
Payer: MEDICARE

## 2019-07-16 PROCEDURE — 97530 THERAPEUTIC ACTIVITIES: CPT

## 2019-07-16 NOTE — PROGRESS NOTES
Marlene Song  : 1930  [place]  [place] [address]  [phone]   [phone]      OUTPATIENT PHYSICAL THERAPY: Daily Treatment Note  2019   ICD-10: Treatment Diagnosis: Muscle weakness (generalized) (M62.81)Repeated falls (R29.6)     Effective Dates: 2019 TO 2019 (90 days). Frequency/Duration: 2 times a week for 90 Days  GOALS: (Goals have been discussed and agreed upon with patient.)  Short-Term Functional Goals: Time Frame: 2 weeks  1. Pt to report compliance with HEP. - MET  Discharge Goals: Time Frame: 12 wweks  1. Pt to demonstrate quad strength to allow sit to stand without UE support or bracing legs against a chair. - ongoing  2. Pt to increase SLS > 5 sec for safe amb level surfaces. - ongoing  3. Pt to increase hip strength to resume walking the gym track for exercise. - ongoing    _________________________________________________________________________  Pre-treatment Symptoms/Complaints:  I am not going to do some of those hip ex's because I was sore again and I am just not going to do them. I can do my own ex's anyway. I am using my cane. I am fine with it. I don't have any problems.   Pain: Initial: 0/10 Post Session:  0/10   Medications Last Reviewed:  2019    Updated Objective Findings:  19  ROM:      0 degrees DF B   Other motions WFL       Strength:      Right  Hip flex 4-/5  Hip ext 4/5  Hip abd 4/5  Knee ext 5/5  PF 4/5 Left  4-/5  4-/5  4-/5  5/5  4/5      Functional Mobility:         Gait/Ambulation:  using straight cane, L LE has rigidity when walking         Transfers:  Uses hands to push up        Stairs:  Uses hands to pull on rails, balance checks backwards  Balance:          < 2 sec B      Tool Used: Smith Balance Scale  Score:  Initial: 56 Most Recent:  (Date: 19 )   Interpretation of Score: Each section is scored on a 0-4 scale, 0 representing the patients inability to perform the task and 4 representing independence.  The scores of each section are added together for a total score of 56.  The higher the patients score, the more independent the patient is. Any score below 45 indicates increased risk for falls. TREATMENT:   THERAPEUTIC ACTIVITY: ( see below for minutes): Therapeutic activities per grid below to improve mobility. Required moderate verbal and manual cues to improve functional mobility . THERAPEUTIC EXERCISE: (see below for minutes):  Exercises per grid below to improve strength. Required moderate verbal and manual cues to promote proper body alignment, promote proper body posture, promote proper body mechanics and promote proper body breathing techniques. Progressed resistance, range, repetitions and complexity of movement as indicated. MANUAL THERAPY: (see below for minutes): Joint mobilization and Soft tissue mobilization was utilized and necessary because of the patient's restricted joint motion, painful spasm, loss of articular motion and restricted motion of soft tissue.    MODALITIES: (see below for minutes):      for pain modulation       Date: 6/4/19  Visit 7 6/6/19  Visit 8  PN 6/13/19  Visit 9 6/18/19  Visit 10 6/20/19  Visit 11 7/2/19  Visit 12 7/9/19  Visit 13 7/11/19  Visit 14 7/16/19  Visit 15    Modalities:                          Therapeutic Exercise: 20 mins 10 mins 15 mins 15 mins 15 mins 15 mins 15 mins 15 mins 5 mins    SLR B X 30            Calf raises X 30 X 30 X 30 X 30 X 30 X 30 X 30 X 30 X 30    S/L hip abd B X 30  X 30 X 30 X 30 X 30 X 30 X 30     Clams B X 30  X 30 X 30 X 30 X 30 X 30 X 30     Marching in place  Fast pace  CGA On foam   CGA On foam  CGA On foam  CGA CGA x 30 each leg CGA x 30 each leg  CGA x 30 each leg CGA x 30  Each leg                 Manual Therapy:                          Therapeutic Activities: 25 mins 30 mins 30 mins 25 mins 25 mins 30 mins 25 mins 30 mins 35 mins    Pt education, HEP, safety education             Sit to stand without UE support X 30 4 x 10  4 x 10 loading L  X 30 X 30 X 30 X 30 X 30 X 30    SLS B X 5 CGA X 3 min assist to hold 1 min 1 min assisted holds x 4 X 3 ea CGA X 3 ea CGA X 10 ea CGA X 10 ea CGA X 5 each CGA X 5 each CGA    bridging  X 30  Single x 30 X 30  Single x 30 X 30  Single x 30 Single x 30 Marching   x 30  Single x 30     Tandem stance, twists eyes open and closed 5 mins 5 mins 5 mins  On floor  On floor On floor On floor On floor    Step ups ant B 6 in x 30 6 in x 30 6 in x 30  6 in x 30  8 in x 20 8 in x 20 8 in x 20    Lat stepping in parallel bars             Foam standing, twists   Eyes open and closed Eyes open, closed         Feet together, eyes open and closed, perturbations  5 mins  5 mins  5 mins  3 mins 3 mins    Foot taps on step  3 mins  CGA      X 30 X 30    stairs   CGA CGA CGA CGA CGA      Tall kneeling on table      perturbations  perturbations perturbations      HEP: weight shifted to balls of feet, stagger stance for stability    [facility] Portal  Treatment/Session Summary:    · Response to Treatment:  Pt is convinced glut med soreness is causing damage to her hip. She is still unsteady with gait but she remains adamant she is safe and stable. Pt to decide if she wants to continue therapy at this time. Discussed with daughter that it is only a matter of time before her mother falls again so we will need to have enough visits left at that time to work with her. The daughter is aware of her mother's unsteadiness as well as her denial that she is a fall risk and is very frustrated by it. Pt was able to achieve 3 sec SLS on R, 2 sec L.  · Communication/Consultation:  None today  · Equipment provided today:  None today  · Recommendations/Intent for next treatment session: Next visit will focus on strength and balance training with continued pt education.     Total Treatment Billable Duration:  40 mins  PT Patient Time In/Time Out  Time In: 1100  Time Out: 1145    Tina Fuentes, PT

## 2019-07-17 NOTE — THERAPY DISCHARGE
Nena Duncan   ([facility]:[birth year]/9011) Joel Helm [address], [address] [place] [address]  [phone]   [phone]           Discontinuation summary    REFERRING PHYSICIAN: Ligia Howard NP  Return Physician Appointment: unknown  MEDICAL/REFERRING DIAGNOSIS:  · Other malaise [R53.81]  ATTENDANCE: Nena Duncan has attended 15 out of 15 visits, with 0 cancellation(s) and 0 no shows. ASSESSMENT:  DATE: 7/17/2019    PROGRESS: Nena Duncan made some improvement in hip and quad strength but balance remains a significant issue. She is unable to stand on the left LE and can demonstrate 1-3 seconds on the right making her a continued high fall risk. The pt had been using a walker due to therapist recommendation but was using her cane at her last visit. She continues to state she does not need therapy or to use a walker because she is fine and does not plan to fall again. She decided to cancel remaining appts because she isn't being told she is safe to return to independent living or ambulation without a walker. RECOMMENDATIONS: Pt has been discharged with recommendation to continue her HEP. She or her daughter are to call with any questions.      Thank you for this referral,    Wanda Fuentes, PT

## 2019-07-18 ENCOUNTER — APPOINTMENT (OUTPATIENT)
Dept: PHYSICAL THERAPY | Age: 84
End: 2019-07-18
Payer: MEDICARE

## 2019-07-23 ENCOUNTER — APPOINTMENT (OUTPATIENT)
Dept: PHYSICAL THERAPY | Age: 84
End: 2019-07-23
Payer: MEDICARE

## 2019-07-25 ENCOUNTER — APPOINTMENT (OUTPATIENT)
Dept: PHYSICAL THERAPY | Age: 84
End: 2019-07-25
Payer: MEDICARE

## 2019-07-30 ENCOUNTER — APPOINTMENT (OUTPATIENT)
Dept: PHYSICAL THERAPY | Age: 84
End: 2019-07-30
Payer: MEDICARE

## 2019-08-01 ENCOUNTER — APPOINTMENT (OUTPATIENT)
Dept: PHYSICAL THERAPY | Age: 84
End: 2019-08-01

## 2019-08-06 ENCOUNTER — APPOINTMENT (OUTPATIENT)
Dept: PHYSICAL THERAPY | Age: 84
End: 2019-08-06

## 2019-08-08 ENCOUNTER — APPOINTMENT (OUTPATIENT)
Dept: PHYSICAL THERAPY | Age: 84
End: 2019-08-08

## 2019-08-13 ENCOUNTER — APPOINTMENT (OUTPATIENT)
Dept: PHYSICAL THERAPY | Age: 84
End: 2019-08-13

## 2019-08-15 ENCOUNTER — APPOINTMENT (OUTPATIENT)
Dept: PHYSICAL THERAPY | Age: 84
End: 2019-08-15

## 2019-08-20 ENCOUNTER — APPOINTMENT (OUTPATIENT)
Dept: PHYSICAL THERAPY | Age: 84
End: 2019-08-20

## 2019-08-22 ENCOUNTER — APPOINTMENT (OUTPATIENT)
Dept: PHYSICAL THERAPY | Age: 84
End: 2019-08-22

## 2019-08-27 ENCOUNTER — APPOINTMENT (OUTPATIENT)
Dept: PHYSICAL THERAPY | Age: 84
End: 2019-08-27

## 2019-08-29 ENCOUNTER — APPOINTMENT (OUTPATIENT)
Dept: PHYSICAL THERAPY | Age: 84
End: 2019-08-29
